# Patient Record
(demographics unavailable — no encounter records)

---

## 2025-02-24 NOTE — HISTORY OF PRESENT ILLNESS
[de-identified] : 36 year old male here for snoring for many years. The patient presents with a history of snoring, mouth breathing, gasping and witnessed apnea at night when sleeping. Pt states he feels poorly rested during the day. Denies previous sleep study.  Has some EDS often.

## 2025-02-24 NOTE — ASSESSMENT
[FreeTextEntry1] : Snoring with suspicion for HALINA.  Check PSG. Start Flonase for congestion. Seek attention for epistaxis, nasal discharge, facial pain/pressure, fever, chills, headache. Followup 2 months

## 2025-02-24 NOTE — PHYSICAL EXAM
[de-identified] : ITH and nasal congestion present [Midline] : trachea located in midline position [de-identified] : narrow palate [Normal] : no neck adenopathy

## 2025-05-22 NOTE — ASSESSMENT
[FreeTextEntry1] : 36-year-old male with no significant past medical history presents to office with complaints of chronic neck and lower back pain.  I spent most of today's office visit (45 minutes) reviewing the patient's x-rays, discussing likely etiologies, pathogenesis, further diagnostic workup and nonoperative management.  X-rays cervical and lumbar spine largely unremarkable with the exception of modest L5-S1 degenerative disc disease.  Clinically, the patient presents with cervical hyperlordosis and hypermobility, especially in extension and left lateral rotation.  I explained my suspicion for underlying ligamentous laxity especially of his anterior longitudinal ligament, facet joint capsules and other supporting soft tissue structures.  This may partly explain the patient's cervicogenic headaches, as well as some of the more musculoskeletal pain in his neck and posterior shoulders.  I explained my suspicion for underlying distal entrapment neuropathies at the wrists which we will evaluate with EMG/NCS.  Regarding the patient's chronic lower back pain, he does have a degenerative appearing L5-S1 disc space along with mild facet arthropathy.  I provided the patient with a prescription for physical therapy to focus on pain relieving modalities, cervical and lumbar stabilization exercises, postural reeducation and neuromuscular control.  We may consider obtaining MRI scans of both his cervical and lumbar spines should the patient be unable to advance his rehabilitation program or if his neurological examination changes.  Specifically, we may consider referral for sclerotherapy injection based therapy to the cervical spine to treat the patient's instability.  The patient is in agreement with the plan.  All questions have been answered.  Return to office for EMG bilateral upper extremities.  This patient is being managed for a complex chronic pain condition that requires ongoing medical management. The nature of this condition requires a longitudinal relationship and monitoring over time for appropriate treatment

## 2025-05-22 NOTE — DATA REVIEWED
[Plain X-Rays] : plain X-Rays [FreeTextEntry1] : X-rays C-spine (2 views obtained at today's office visit): The patient has a hyperlordotic cervical spine without demonstrable fracture or spondylolisthesis.  Disc space heights are relatively well-maintained.  There is no obvious facet joint arthropathy.  X-rays L-spine (2 views obtained at today's office visit): Disc space heights are relatively well maintained with the exception of modest degenerative disease L5-S1.  The bilateral sacroiliac joints are well-maintained.  There is no significant scoliosis.  The vertebrae do not appear osteopenic.
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
## 2025-05-22 NOTE — PHYSICAL EXAM
[FreeTextEntry1] : Constitutional: NAD, seated at rest w/ forward flexion; correctable sway-back deformity   C Spine: Lhermittes negative ROM: Extension 50/55', 45' R lateral, 60' L lateral  Billings's negative  DTR 2+ b/l symmetric  MMT 5/5 throughout  Sensation diminished on L relative to R C5,6,7 Weakly positive Phalen's Negative Tinel's b/l  L Spine: ROM: Full forward flexion without pain (unable to palm the floor with his knees fully bent); 40 to 45 degrees passive extension with pain; 30 to 35 degrees sidebending bilaterally without pain DTR 2+ knees/ankles Sensation: Slight decreased to pinprick sensation along the right L4 and S1 dermatomes Negative R CLAUDIO  Equivocal L CLAUDIO Negative SLR b/l  Able to toe and heel walk No genu recurvatum

## 2025-05-29 NOTE — ASSESSMENT
[FreeTextEntry1] : NCS B/L UE performed at today's office visit.  EDX findings are significant for (1) minimal slowing of median sensory nerve conduction velocity across both wrists which does not meet strict criteria for median mononeuropathy; (2) mild, demyelinating, motor ulnar mononeuropathies across both elbows.  There is no definitive electrodiagnostic evidence of a concomitant sensory polyneuropathy affecting the patient's arms.  Full report to follow.

## 2025-05-29 NOTE — PROCEDURE
[de-identified] : PRE-PROCEDURE  * Was H&P completed and in chart at time of procedure ?  YES * Were the indications for the procedure appropriate ?  YES * Were the practitioner's entries in the patient's medical record appropriate ?  YES  PROCEDURE * Did the practitioner maintain proper sterile technique ?  YES * If bleeding was encountered, did the practitioner manage it appropriately?  YES * Were complications, if any, recognized and managed appropriately?  YES * Was the practitioner's use of diagnostic services (e.g., lab, x-ray, MRI, CT) appropriate?  YES  POST-PROCEDURE * Did the pre-procedure diagnosis coincide with the findings of the procedure?  YES * Was the procedure report complete, accurate and timely?  YES